# Patient Record
Sex: FEMALE | Race: OTHER | HISPANIC OR LATINO | URBAN - METROPOLITAN AREA
[De-identification: names, ages, dates, MRNs, and addresses within clinical notes are randomized per-mention and may not be internally consistent; named-entity substitution may affect disease eponyms.]

---

## 2022-07-07 ENCOUNTER — OFFICE (OUTPATIENT)
Dept: URBAN - METROPOLITAN AREA CLINIC 105 | Facility: CLINIC | Age: 57
End: 2022-07-07

## 2022-07-07 VITALS
HEART RATE: 71 BPM | WEIGHT: 159 LBS | OXYGEN SATURATION: 98 % | DIASTOLIC BLOOD PRESSURE: 63 MMHG | HEIGHT: 63 IN | SYSTOLIC BLOOD PRESSURE: 107 MMHG

## 2022-07-07 DIAGNOSIS — R07.9 CHEST PAIN, UNSPECIFIED: ICD-10-CM

## 2022-07-07 DIAGNOSIS — K21.9 GASTRO-ESOPHAGEAL REFLUX DISEASE WITHOUT ESOPHAGITIS: ICD-10-CM

## 2022-07-07 DIAGNOSIS — Z80.0 FAMILY HISTORY OF MALIGNANT NEOPLASM OF DIGESTIVE ORGANS: ICD-10-CM

## 2022-07-07 DIAGNOSIS — K59.00 CONSTIPATION, UNSPECIFIED: ICD-10-CM

## 2022-07-07 DIAGNOSIS — R42 DIZZINESS AND GIDDINESS: ICD-10-CM

## 2022-07-07 DIAGNOSIS — Z12.11 ENCOUNTER FOR SCREENING FOR MALIGNANT NEOPLASM OF COLON: ICD-10-CM

## 2022-07-07 DIAGNOSIS — R10.10 UPPER ABDOMINAL PAIN, UNSPECIFIED: ICD-10-CM

## 2022-07-07 PROCEDURE — 99204 OFFICE O/P NEW MOD 45 MIN: CPT

## 2022-07-07 RX ORDER — SODIUM PICOSULFATE, MAGNESIUM OXIDE, AND ANHYDROUS CITRIC ACID 10; 3.5; 12 MG/160ML; G/160ML; G/160ML
LIQUID ORAL
Qty: 1 | Refills: 0 | Status: ACTIVE
Start: 2022-07-07

## 2022-07-07 NOTE — SERVICEHPINOTES
Maria Elena Lopez   is seen for an initial visit today.  56-year-old woman who presents for evaluation for colonoscopy for colon cancer screening and chest comfort.  Interview conducted in [place]ian.  She is accompanied by her daughter.  Patient states that she feels something is stuck in her chest.  Also with associated chest pressure and dizziness.  She reports chronic cough.  She takes Imodium 20 mg twice daily without significant improvement in her symptoms.  She denies dysphagia.  She denies blood in stool or black stool.  Also has rare constipation with upper abdominal pain.  She reports having dizziness and vertigo when lying down.  She can takes Advil for neck pain. She states that she was previously told that she had low blood pressure and was advised to increase her salt intake.

## 2022-07-07 NOTE — SERVICENOTES
-Endoscopia superior ordenada con prueba de pH Bravo
-colonoscopia ordenada
-Continúe con famotidina para el reflujo armaan suspenda neelima medicamento 1 semana antes de hui procedimiento
-programar ora con cardiólogo para posible prueba de esfuerzo
-Discutí hui esthela con hui proveedor de atención primaria
-Ebonie un suplemento de fibra dos veces al día.